# Patient Record
Sex: FEMALE | Race: WHITE | NOT HISPANIC OR LATINO
[De-identification: names, ages, dates, MRNs, and addresses within clinical notes are randomized per-mention and may not be internally consistent; named-entity substitution may affect disease eponyms.]

---
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
## 2024-06-12 ENCOUNTER — APPOINTMENT (RX ONLY)
Dept: URBAN - METROPOLITAN AREA CLINIC 85 | Facility: CLINIC | Age: 24
Setting detail: DERMATOLOGY
End: 2024-06-12

## 2024-06-12 DIAGNOSIS — L60.0 INGROWING NAIL: ICD-10-CM

## 2024-06-12 PROCEDURE — ? COUNSELING

## 2024-06-12 PROCEDURE — 99204 OFFICE O/P NEW MOD 45 MIN: CPT

## 2024-06-12 PROCEDURE — ? ADDITIONAL NOTES

## 2024-06-12 PROCEDURE — ? PRESCRIPTION

## 2024-06-12 RX ORDER — DOXYCYCLINE HYCLATE 100 MG/1
CAPSULE, GELATIN COATED ORAL
Qty: 14 | Refills: 0 | Status: ERX | COMMUNITY
Start: 2024-06-12

## 2024-06-12 RX ADMIN — DOXYCYCLINE HYCLATE: 100 CAPSULE, GELATIN COATED ORAL at 00:00

## 2024-06-12 ASSESSMENT — LOCATION SIMPLE DESCRIPTION DERM
LOCATION SIMPLE: LEFT GREAT TOE
LOCATION SIMPLE: RIGHT GREAT TOE

## 2024-06-12 ASSESSMENT — LOCATION DETAILED DESCRIPTION DERM
LOCATION DETAILED: LEFT DORSAL GREAT TOE
LOCATION DETAILED: RIGHT DISTAL PLANTAR GREAT TOE

## 2024-06-12 ASSESSMENT — LOCATION ZONE DERM: LOCATION ZONE: TOE

## 2024-06-12 NOTE — PROCEDURE: ADDITIONAL NOTES
Additional Notes: Pt has had ingrown toenails for a long time states pediatrician has treated before, gets pedicures and they keep coming back. Advised we will look today and decide. Pt states both sides are into the skin on both big toes. Advised ingrown nail on right big toe isn’t dug in yet on the corner and left big toe slightly digging in. A little bit of red pt states not to much pain depends on shoes. Advised ingrown toenails not severe. Asked pt is she in too much pain that nail could be trimmed as much as possible. Advised certain told podiatry, can prescribe antibiotic due to thinking bacteria is more effecting under the nail and inflammation. Doxycycline for one week, recommended podiatry mother agrees
Render Risk Assessment In Note?: no
Detail Level: Simple